# Patient Record
Sex: FEMALE | Race: WHITE | NOT HISPANIC OR LATINO | URBAN - METROPOLITAN AREA
[De-identification: names, ages, dates, MRNs, and addresses within clinical notes are randomized per-mention and may not be internally consistent; named-entity substitution may affect disease eponyms.]

---

## 2023-09-22 ENCOUNTER — HOSPITAL ENCOUNTER (OUTPATIENT)
Dept: DATA CONVERSION | Facility: HOSPITAL | Age: 68
End: 2023-09-22
Attending: INTERNAL MEDICINE | Admitting: INTERNAL MEDICINE
Payer: COMMERCIAL

## 2023-09-22 DIAGNOSIS — Q39.9 CONGENITAL MALFORMATION OF ESOPHAGUS, UNSPECIFIED: ICD-10-CM

## 2023-09-22 DIAGNOSIS — K22.89 OTHER SPECIFIED DISEASE OF ESOPHAGUS: ICD-10-CM

## 2023-09-22 DIAGNOSIS — K31.7 POLYP OF STOMACH AND DUODENUM: ICD-10-CM

## 2023-09-22 DIAGNOSIS — R13.10 DYSPHAGIA, UNSPECIFIED: ICD-10-CM

## 2023-09-22 DIAGNOSIS — R10.13 EPIGASTRIC PAIN: ICD-10-CM

## 2023-09-22 DIAGNOSIS — K21.9 GASTRO-ESOPHAGEAL REFLUX DISEASE WITHOUT ESOPHAGITIS: ICD-10-CM

## 2023-09-23 PROBLEM — K59.09 CHRONIC CONSTIPATION: Status: ACTIVE | Noted: 2023-09-23

## 2023-09-23 PROBLEM — E78.5 HYPERLIPIDEMIA: Status: ACTIVE | Noted: 2023-09-23

## 2023-09-23 PROBLEM — M99.05 SEGMENTAL AND SOMATIC DYSFUNCTION OF PELVIC REGION: Status: ACTIVE | Noted: 2023-09-23

## 2023-09-23 PROBLEM — E04.9 GOITER: Status: ACTIVE | Noted: 2023-09-23

## 2023-09-23 PROBLEM — M06.9 RHEUMATOID ARTHRITIS (MULTI): Status: ACTIVE | Noted: 2023-09-23

## 2023-09-23 PROBLEM — R07.81 RIB PAIN ON LEFT SIDE: Status: ACTIVE | Noted: 2023-09-23

## 2023-09-23 PROBLEM — R91.1 LUNG NODULE: Status: ACTIVE | Noted: 2023-09-23

## 2023-09-23 PROBLEM — M85.80 OSTEOPENIA: Status: ACTIVE | Noted: 2023-09-23

## 2023-09-23 PROBLEM — R10.9 ABDOMINAL CRAMPING: Status: ACTIVE | Noted: 2023-09-23

## 2023-09-23 PROBLEM — K21.9 GERD (GASTROESOPHAGEAL REFLUX DISEASE): Status: ACTIVE | Noted: 2023-09-23

## 2023-09-23 PROBLEM — R13.10 DYSPHAGIA: Status: ACTIVE | Noted: 2023-09-23

## 2023-09-23 PROBLEM — L68.0 HIRSUTISM: Status: ACTIVE | Noted: 2023-09-23

## 2023-09-23 PROBLEM — E25.0 CONGENITAL ADRENAL HYPERPLASIA (MULTI): Status: ACTIVE | Noted: 2023-09-23

## 2023-09-23 PROBLEM — R10.13 ABDOMINAL PAIN, EPIGASTRIC: Status: ACTIVE | Noted: 2023-09-23

## 2023-09-23 PROBLEM — R11.0 CHRONIC NAUSEA: Status: ACTIVE | Noted: 2023-09-23

## 2023-09-23 PROBLEM — D72.819 LEUKOPENIA: Status: ACTIVE | Noted: 2023-09-23

## 2023-09-23 PROBLEM — E55.9 VITAMIN D DEFICIENCY: Status: ACTIVE | Noted: 2023-09-23

## 2023-09-23 RX ORDER — CONJUGATED ESTROGENS 0.62 MG/G
1 CREAM VAGINAL 3 TIMES WEEKLY
COMMUNITY
Start: 2023-04-18

## 2023-09-23 RX ORDER — MELOXICAM 7.5 MG/1
1 TABLET ORAL DAILY PRN
COMMUNITY
Start: 2021-06-16

## 2023-09-23 RX ORDER — HYDROQUINONE 40 MG/G
CREAM TOPICAL
COMMUNITY
Start: 2023-05-30

## 2023-09-23 RX ORDER — VENLAFAXINE 37.5 MG/1
TABLET ORAL
COMMUNITY
Start: 2023-04-18

## 2023-09-23 RX ORDER — HYDROCORTISONE 25 MG/G
CREAM TOPICAL
COMMUNITY
Start: 2022-10-27

## 2023-09-23 RX ORDER — HYDROXYCHLOROQUINE SULFATE 200 MG/1
1 TABLET, FILM COATED ORAL DAILY
COMMUNITY
Start: 2015-02-03

## 2023-09-23 RX ORDER — ESTRADIOL 0.5 MG/1
TABLET ORAL
COMMUNITY
Start: 2023-04-05

## 2023-09-23 RX ORDER — TRETINOIN 1 MG/G
CREAM TOPICAL NIGHTLY
COMMUNITY

## 2023-09-23 RX ORDER — ASPIRIN 81 MG/1
TABLET ORAL
COMMUNITY
Start: 2023-01-06

## 2023-09-23 RX ORDER — TOFACITINIB 11 MG/1
TABLET, FILM COATED, EXTENDED RELEASE ORAL
COMMUNITY
Start: 2021-06-22

## 2023-09-23 RX ORDER — ONDANSETRON 4 MG/1
TABLET, FILM COATED ORAL
COMMUNITY
Start: 2023-03-22

## 2023-09-23 RX ORDER — MUPIROCIN 20 MG/G
OINTMENT TOPICAL
COMMUNITY
Start: 2023-07-10

## 2023-09-23 RX ORDER — TRETINOIN 0.1 MG/G
GEL TOPICAL
COMMUNITY

## 2023-09-23 RX ORDER — CIMETIDINE 800 MG/1
800 TABLET, FILM COATED ORAL 2 TIMES DAILY
COMMUNITY
Start: 2020-05-26

## 2023-09-23 RX ORDER — OMEPRAZOLE 40 MG/1
1 CAPSULE, DELAYED RELEASE ORAL 2 TIMES DAILY
COMMUNITY
Start: 2019-02-06

## 2023-09-23 RX ORDER — ADAPALENE GEL USP, 0.3% 3 MG/G
GEL TOPICAL
COMMUNITY
Start: 2023-02-13

## 2023-09-23 RX ORDER — PROMETHAZINE HYDROCHLORIDE 25 MG/1
1 SUPPOSITORY RECTAL AS NEEDED
COMMUNITY
Start: 2020-11-25

## 2023-09-23 RX ORDER — ATORVASTATIN CALCIUM 20 MG/1
1 TABLET, FILM COATED ORAL DAILY
COMMUNITY
Start: 2013-09-19

## 2023-09-23 RX ORDER — MUPIROCIN CALCIUM 20 MG/G
CREAM TOPICAL
COMMUNITY
Start: 2023-07-12

## 2023-09-23 RX ORDER — IPRATROPIUM BROMIDE 21 UG/1
2 SPRAY, METERED NASAL 2 TIMES DAILY
COMMUNITY
Start: 2012-12-01

## 2023-09-23 RX ORDER — GABAPENTIN 400 MG/1
1 CAPSULE ORAL 3 TIMES DAILY
COMMUNITY
Start: 2012-12-19

## 2023-10-24 ENCOUNTER — APPOINTMENT (OUTPATIENT)
Dept: ENDOCRINOLOGY | Facility: CLINIC | Age: 68
End: 2023-10-24
Payer: COMMERCIAL

## 2023-10-25 ENCOUNTER — OFFICE VISIT (OUTPATIENT)
Dept: GASTROENTEROLOGY | Facility: CLINIC | Age: 68
End: 2023-10-25
Payer: COMMERCIAL

## 2023-10-25 VITALS
BODY MASS INDEX: 20.91 KG/M2 | TEMPERATURE: 97.2 F | HEART RATE: 76 BPM | HEIGHT: 63 IN | SYSTOLIC BLOOD PRESSURE: 125 MMHG | WEIGHT: 118 LBS | DIASTOLIC BLOOD PRESSURE: 75 MMHG

## 2023-10-25 DIAGNOSIS — K21.9 GASTROESOPHAGEAL REFLUX DISEASE WITHOUT ESOPHAGITIS: Primary | ICD-10-CM

## 2023-10-25 DIAGNOSIS — R13.19 ESOPHAGEAL DYSPHAGIA: ICD-10-CM

## 2023-10-25 PROCEDURE — 99214 OFFICE O/P EST MOD 30 MIN: CPT | Performed by: INTERNAL MEDICINE

## 2023-10-25 PROCEDURE — 1159F MED LIST DOCD IN RCRD: CPT | Performed by: INTERNAL MEDICINE

## 2023-10-25 PROCEDURE — 1126F AMNT PAIN NOTED NONE PRSNT: CPT | Performed by: INTERNAL MEDICINE

## 2023-10-25 RX ORDER — MECLIZINE HYDROCHLORIDE 25 MG/1
TABLET ORAL
COMMUNITY
Start: 1970-01-01

## 2023-10-25 RX ORDER — ISOTRETINOIN 40 MG/1
40 CAPSULE ORAL
COMMUNITY
Start: 2023-09-06

## 2023-10-25 RX ORDER — BUPROPION HCL 150 MG
TABLET, EXTENDED RELEASE 24 HR ORAL
COMMUNITY
Start: 2023-09-24

## 2023-10-25 RX ORDER — LIFITEGRAST 50 MG/ML
SOLUTION/ DROPS OPHTHALMIC
COMMUNITY

## 2023-10-25 RX ORDER — PIROXICAM 20 MG/1
20 CAPSULE ORAL
COMMUNITY
Start: 2020-09-02

## 2023-10-25 RX ORDER — DIAZEPAM 10 MG/1
TABLET ORAL
COMMUNITY
Start: 1970-01-01

## 2023-10-25 RX ORDER — DOCUSATE SODIUM 100 MG/1
CAPSULE, LIQUID FILLED ORAL
COMMUNITY
Start: 1970-01-01

## 2023-10-25 RX ORDER — DICLOFENAC SODIUM 10 MG/G
1 GEL TOPICAL 3 TIMES DAILY
COMMUNITY
Start: 2021-03-15

## 2023-10-25 NOTE — PATIENT INSTRUCTIONS
As we discussed you have esophageal motility issues likely related to the RA. You should continue to monitor your symptoms and stay in the medications. You are scheduled for routine colonoscopy.

## 2023-10-25 NOTE — PROGRESS NOTES
"History Of Present Illness  Adelaide Cedillo is a 67 y.o. female presenting with GERD    She has a history of rheumatoid arthritis and has had a longstanding history of digestive problems.  Most recently she underwent an upper endoscopy that showed a very tortuous esophagus but no mucosal lesions.  She also had a benign gastric polyp seen.  Her last colonoscopy was in 2016 and was negative for polyps.  She is currently being treated with omeprazole, cimetidine and Zofran as needed. Other workup has included a manometry in 2018 that showed absent peristalsis and low LES pressure and a normal gastric emptying study. A barium swallow showed significant reflux as well as a cricopharyngeal bar that was not appreciated on EGD.      Past Medical History  Past Medical History:   Diagnosis Date    Encounter for therapeutic drug level monitoring 04/03/2017    Encounter for monitoring of methotrexate therapy       Surgical History  No past surgical history on file.     Social History  She has no history on file for tobacco use, alcohol use, and drug use.    Family History  Family History   Problem Relation Name Age of Onset    Other (chronic kidney disease) Mother      Diabetes Mother      Hypertension Mother      Other (creutzfeldt ying disease) Father          Allergies  Adhesive tape-silicones, Fentanyl, Iodinated contrast media, Latex, Prochlorperazine, Sulfa (sulfonamide antibiotics), and Iodine    Review of Systems     Physical Exam    Lab Results   Component Value Date    GLUCOSE 83 02/08/2022    CALCIUM 9.7 02/08/2022     02/08/2022    K 4.1 02/08/2022    CO2 31 02/08/2022     (H) 02/08/2022    BUN 19 02/08/2022    CREATININE 0.97 02/08/2022   No results found for: \"WBC\", \"HGB\", \"HCT\", \"MCV\", \"PLT\"      No lab exists for component: \"LABALBU\"     Last Recorded Vitals  Blood pressure 125/75, pulse 76, temperature 36.2 °C (97.2 °F), height 1.6 m (5' 3\"), weight 53.5 kg (118 lb).    Relevant Results           "   ASSESSMENT & PLAN  Problem List Items Addressed This Visit    None     I suspect that her esophageal motility is related to her connective tissue disease and unfortunately there are no effective medical treatments for the dysmotility.  She should avoid any foods that are more difficult to swallow and continue on acid suppression as she is doing.  She is scheduled for colonoscopy later this year for routine screening.    I spent 30 minutes in the professional and overall care of this patient.      Lance Quintero MD

## 2023-12-01 ENCOUNTER — ANESTHESIA (OUTPATIENT)
Dept: GASTROENTEROLOGY | Facility: HOSPITAL | Age: 68
End: 2023-12-01
Payer: COMMERCIAL

## 2023-12-01 ENCOUNTER — HOSPITAL ENCOUNTER (OUTPATIENT)
Dept: GASTROENTEROLOGY | Facility: HOSPITAL | Age: 68
Setting detail: OUTPATIENT SURGERY
Discharge: HOME | End: 2023-12-01
Payer: COMMERCIAL

## 2023-12-01 ENCOUNTER — ANESTHESIA EVENT (OUTPATIENT)
Dept: GASTROENTEROLOGY | Facility: HOSPITAL | Age: 68
End: 2023-12-01
Payer: COMMERCIAL

## 2023-12-01 VITALS
HEIGHT: 63 IN | DIASTOLIC BLOOD PRESSURE: 68 MMHG | HEART RATE: 91 BPM | BODY MASS INDEX: 20.38 KG/M2 | OXYGEN SATURATION: 98 % | TEMPERATURE: 96.9 F | SYSTOLIC BLOOD PRESSURE: 133 MMHG | RESPIRATION RATE: 14 BRPM | WEIGHT: 115 LBS

## 2023-12-01 DIAGNOSIS — Z86.010 PERSONAL HISTORY OF COLONIC POLYPS: Primary | ICD-10-CM

## 2023-12-01 DIAGNOSIS — R11.0 CHRONIC NAUSEA: Primary | ICD-10-CM

## 2023-12-01 PROBLEM — R11.2 PONV (POSTOPERATIVE NAUSEA AND VOMITING): Status: ACTIVE | Noted: 2023-12-01

## 2023-12-01 PROBLEM — Z98.890 PONV (POSTOPERATIVE NAUSEA AND VOMITING): Status: ACTIVE | Noted: 2023-12-01

## 2023-12-01 PROCEDURE — 88305 TISSUE EXAM BY PATHOLOGIST: CPT | Mod: TC,SUR | Performed by: INTERNAL MEDICINE

## 2023-12-01 PROCEDURE — 7100000009 HC PHASE TWO TIME - INITIAL BASE CHARGE: Performed by: INTERNAL MEDICINE

## 2023-12-01 PROCEDURE — 3700000001 HC GENERAL ANESTHESIA TIME - INITIAL BASE CHARGE: Performed by: INTERNAL MEDICINE

## 2023-12-01 PROCEDURE — 2500000004 HC RX 250 GENERAL PHARMACY W/ HCPCS (ALT 636 FOR OP/ED)

## 2023-12-01 PROCEDURE — 7100000010 HC PHASE TWO TIME - EACH INCREMENTAL 1 MINUTE: Performed by: INTERNAL MEDICINE

## 2023-12-01 PROCEDURE — 88305 TISSUE EXAM BY PATHOLOGIST: CPT | Performed by: PATHOLOGY

## 2023-12-01 PROCEDURE — A45378 PR COLONOSCOPY,DIAGNOSTIC

## 2023-12-01 PROCEDURE — A45378 PR COLONOSCOPY,DIAGNOSTIC: Performed by: ANESTHESIOLOGY

## 2023-12-01 PROCEDURE — 3700000002 HC GENERAL ANESTHESIA TIME - EACH INCREMENTAL 1 MINUTE: Performed by: INTERNAL MEDICINE

## 2023-12-01 PROCEDURE — 45385 COLONOSCOPY W/LESION REMOVAL: CPT | Performed by: INTERNAL MEDICINE

## 2023-12-01 PROCEDURE — 2720000007 HC OR 272 NO HCPCS: Performed by: INTERNAL MEDICINE

## 2023-12-01 PROCEDURE — 45380 COLONOSCOPY AND BIOPSY: CPT | Performed by: INTERNAL MEDICINE

## 2023-12-01 RX ORDER — ONDANSETRON HYDROCHLORIDE 2 MG/ML
INJECTION, SOLUTION INTRAVENOUS AS NEEDED
Status: DISCONTINUED | OUTPATIENT
Start: 2023-12-01 | End: 2023-12-01

## 2023-12-01 RX ORDER — LIDOCAINE HYDROCHLORIDE 10 MG/ML
0.1 INJECTION INFILTRATION; PERINEURAL ONCE
Status: DISCONTINUED | OUTPATIENT
Start: 2023-12-01 | End: 2023-12-02 | Stop reason: HOSPADM

## 2023-12-01 RX ORDER — ONDANSETRON HYDROCHLORIDE 2 MG/ML
4 INJECTION, SOLUTION INTRAVENOUS ONCE AS NEEDED
Status: DISCONTINUED | OUTPATIENT
Start: 2023-12-01 | End: 2023-12-02 | Stop reason: HOSPADM

## 2023-12-01 RX ORDER — SODIUM CHLORIDE, SODIUM LACTATE, POTASSIUM CHLORIDE, CALCIUM CHLORIDE 600; 310; 30; 20 MG/100ML; MG/100ML; MG/100ML; MG/100ML
20 INJECTION, SOLUTION INTRAVENOUS CONTINUOUS
Status: DISCONTINUED | OUTPATIENT
Start: 2023-12-01 | End: 2023-12-02 | Stop reason: HOSPADM

## 2023-12-01 RX ORDER — SODIUM CHLORIDE, SODIUM LACTATE, POTASSIUM CHLORIDE, CALCIUM CHLORIDE 600; 310; 30; 20 MG/100ML; MG/100ML; MG/100ML; MG/100ML
100 INJECTION, SOLUTION INTRAVENOUS CONTINUOUS
Status: DISCONTINUED | OUTPATIENT
Start: 2023-12-01 | End: 2023-12-02 | Stop reason: HOSPADM

## 2023-12-01 RX ORDER — PROPOFOL 10 MG/ML
INJECTION, EMULSION INTRAVENOUS AS NEEDED
Status: DISCONTINUED | OUTPATIENT
Start: 2023-12-01 | End: 2023-12-01

## 2023-12-01 RX ORDER — PROPOFOL 10 MG/ML
INJECTION, EMULSION INTRAVENOUS CONTINUOUS PRN
Status: DISCONTINUED | OUTPATIENT
Start: 2023-12-01 | End: 2023-12-01

## 2023-12-01 RX ORDER — MIDAZOLAM HYDROCHLORIDE 1 MG/ML
INJECTION, SOLUTION INTRAMUSCULAR; INTRAVENOUS AS NEEDED
Status: DISCONTINUED | OUTPATIENT
Start: 2023-12-01 | End: 2023-12-01

## 2023-12-01 RX ORDER — PROMETHAZINE HYDROCHLORIDE 25 MG/1
25 SUPPOSITORY RECTAL EVERY 6 HOURS PRN
Qty: 30 SUPPOSITORY | Refills: 0 | Status: SHIPPED | OUTPATIENT
Start: 2023-12-01 | End: 2023-12-08

## 2023-12-01 RX ADMIN — MIDAZOLAM 2 MG: 1 INJECTION INTRAMUSCULAR; INTRAVENOUS at 09:36

## 2023-12-01 RX ADMIN — PROPOFOL 50 MG: 10 INJECTION, EMULSION INTRAVENOUS at 09:41

## 2023-12-01 RX ADMIN — ONDANSETRON 4 MG: 2 INJECTION INTRAMUSCULAR; INTRAVENOUS at 09:39

## 2023-12-01 RX ADMIN — PROPOFOL 20 MG: 10 INJECTION, EMULSION INTRAVENOUS at 10:09

## 2023-12-01 RX ADMIN — SODIUM CHLORIDE, SODIUM LACTATE, POTASSIUM CHLORIDE, AND CALCIUM CHLORIDE: 600; 310; 30; 20 INJECTION, SOLUTION INTRAVENOUS at 09:36

## 2023-12-01 RX ADMIN — PROPOFOL 200 MCG/KG/MIN: 10 INJECTION, EMULSION INTRAVENOUS at 09:41

## 2023-12-01 ASSESSMENT — PAIN SCALES - GENERAL
PAIN_LEVEL: 0
PAINLEVEL_OUTOF10: 0 - NO PAIN
PAINLEVEL_OUTOF10: 0 - NO PAIN

## 2023-12-01 ASSESSMENT — PAIN - FUNCTIONAL ASSESSMENT
PAIN_FUNCTIONAL_ASSESSMENT: 0-10
PAIN_FUNCTIONAL_ASSESSMENT: 0-10

## 2023-12-01 ASSESSMENT — COLUMBIA-SUICIDE SEVERITY RATING SCALE - C-SSRS
2. HAVE YOU ACTUALLY HAD ANY THOUGHTS OF KILLING YOURSELF?: NO
6. HAVE YOU EVER DONE ANYTHING, STARTED TO DO ANYTHING, OR PREPARED TO DO ANYTHING TO END YOUR LIFE?: NO
1. IN THE PAST MONTH, HAVE YOU WISHED YOU WERE DEAD OR WISHED YOU COULD GO TO SLEEP AND NOT WAKE UP?: NO

## 2023-12-01 NOTE — ANESTHESIA POSTPROCEDURE EVALUATION
Patient: Adelaide Cedillo    Procedure Summary       Date: 12/01/23 Room / Location: [facility]    Anesthesia Start: 0936 Anesthesia Stop: 1019    Procedures:       COLONOSCOPY      EGD Diagnosis:       Personal history of colonic polyps      Personal history of colonic polyps    Scheduled Providers: Lance Quintero MD Responsible Provider: Jasper Hernandez MD    Anesthesia Type: MAC ASA Status: 2            Anesthesia Type: MAC    Vitals Value Taken Time   /76 12/01/23 1033   Temp 36.1 °C (96.9 °F) 12/01/23 1018   Pulse 85 12/01/23 1033   Resp 15 12/01/23 1033   SpO2 98 % 12/01/23 1033       Anesthesia Post Evaluation    Patient location during evaluation: bedside  Patient participation: complete - patient participated  Level of consciousness: awake  Pain score: 0  Pain management: adequate  Airway patency: patent  Cardiovascular status: acceptable  Respiratory status: acceptable  Hydration status: acceptable  Postoperative Nausea and Vomiting: none    There were no known notable events for this encounter.

## 2023-12-01 NOTE — ANESTHESIA PREPROCEDURE EVALUATION
Patient: Adelaide Cedillo    Procedure Information       Date/Time: 12/01/23 0900    Scheduled providers: Lance Quintero MD    Procedures:       COLONOSCOPY      EGD    Location: [facility]            Relevant Problems   Anesthesia   (+) PONV (postoperative nausea and vomiting)      Cardiovascular   (+) Hyperlipidemia   (+) Rib pain on left side      Endocrine   (+) Goiter      GI   (+) Gastroesophageal reflux disease without esophagitis      /Renal (within normal limits)      GI/Hepatic (within normal limits)      Musculoskeletal   (+) Rheumatoid arthritis (CMS/HCC)      Other   (+) Rheumatoid arthritis (CMS/HCC)     Past Medical History:   Diagnosis Date   • Encounter for therapeutic drug level monitoring 04/03/2017    Encounter for monitoring of methotrexate therapy        Social History     Tobacco Use   • Smoking status: Never   • Smokeless tobacco: Never   Substance Use Topics   • Alcohol use: Not Currently      Current Outpatient Medications   Medication Instructions   • adapalene (Differin) 0.3 % gel Topical   • aspirin 81 mg EC tablet oral   • atorvastatin (Lipitor) 20 mg tablet 1 tablet, oral, Daily   • cimetidine (TAGAMET) 800 mg, oral, 2 times daily, and 2 doses at bedtime   • diazePAM (Valium) 10 mg tablet oral   • diclofenac sodium (VOLTAREN) 1 g, Topical, 3 times daily   • docusate sodium (Colace) 100 mg capsule oral, Daily RT   • estradiol (Estrace) 0.5 mg tablet oral   • hydrocortisone 2.5 % cream Topical   • hydroquinone 4 % cream Topical   • hydroxychloroquine (Plaquenil) 200 mg tablet 1 tablet, oral, Daily   • ipratropium (Atrovent) 21 mcg (0.03 %) nasal spray 2 sprays, Each Nostril, 2 times daily   • ISOtretinoin (ACCUTANE) 40 mg, oral, Daily RT   • meclizine (Antivert) 25 mg tablet as needed.   • meloxicam (Mobic) 7.5 mg tablet 1 tablet, oral, Daily PRN   • mupirocin (Bactroban) 2 % cream Topical   • mupirocin (Bactroban) 2 % ointment Topical   • Neurontin 400 mg capsule 1  "capsule, oral, 3 times daily   • omeprazole (PriLOSEC) 40 mg DR capsule 1 capsule, oral, 2 times daily   • ondansetron (Zofran) 4 mg tablet oral   • piroxicam (FELDENE) 20 mg   • Premarin 1 g, vaginal, 3 times weekly, 3 weeks on and 1 week off   • promethazine (Phenergan) 25 mg suppository 1 suppository, rectal, As needed, 4 TO 6 HOURS   • Retin-A 0.1 % cream Topical, Nightly,  APPLY A PEA SIZED AMOUNT   • tretinoin microspheres (Retin-A Micro) 0.1 % gel Topical   • triamcinolone acetonide (KENALOG) 10 mg, intralesional, Every 30 days   • venlafaxine (Effexor) 37.5 mg tablet oral   • Wellbutrin  mg 24 hr tablet    • Xeljanz XR 11 mg tablet extended release 24 hr oral   • Xiidra 5 % dropperette instill one drop into both eyes twice a day      Allergies   Allergen Reactions   • Adhesive Tape-Silicones Unknown     skin redness   • Fentanyl Unknown   • Iodinated Contrast Media Hives   • Latex Other     Redness to skin and irritation   • Prochlorperazine Nausea/vomiting   • Sulfa (Sulfonamide Antibiotics) Hives   • Iodine Rash and Unknown        Chemistry    Lab Results   Component Value Date/Time     02/08/2022 1600    K 4.1 02/08/2022 1600     (H) 02/08/2022 1600    CO2 31 02/08/2022 1600    BUN 19 02/08/2022 1600    CREATININE 0.97 02/08/2022 1600    Lab Results   Component Value Date/Time    CALCIUM 9.7 02/08/2022 1600    ALKPHOS 45 02/08/2022 1600    AST 23 02/08/2022 1600    ALT 16 02/08/2022 1600    BILITOT 0.4 02/08/2022 1600          No results found for: \"WBC\", \"HGB\", \"HCT\", \"PLT\"  No results found for: \"PROTIME\", \"PTT\", \"INR\"  No results found for this or any previous visit (from the past 4464 hour(s)).  No results found for this or any previous visit from the past 1095 days.       Visit Vitals  /68   Pulse 102   Temp 36.5 °C (97.7 °F) (Temporal)   Resp 16   Ht 1.6 m (5' 3\")   Wt 52.2 kg (115 lb)   BMI 20.37 kg/m²   OB Status Postmenopausal   Smoking Status Never   BSA 1.52 m²        " Anesthesia Evaluation      Airway   Mallampati: II  TM distance: >3 FB  Neck ROM: full  Dental - normal exam     Pulmonary     breath sounds clear to auscultation  Cardiovascular     Rhythm: regular  Rate: normal    Neuro/Psych      GI/Hepatic/Renal      Endo/Other    Abdominal                    Clinical information reviewed:   Tobacco  Allergies  Meds  Problems  Med Hx  Surg Hx   Fam Hx  Soc   Hx        NPO Detail:  NPO/Void Status  Date of Last Liquid: 12/01/23  Time of Last Liquid: 1900  Date of Last Solid: 11/29/23  Time of Last Solid: 1700  Last Intake Type: GI prep  Time of Last Void: 0807         Physical Exam    Airway  Mallampati: II  TM distance: >3 FB  Neck ROM: full     Cardiovascular   Rhythm: regular  Rate: normal     Dental - normal exam     Pulmonary   Breath sounds clear to auscultation     Abdominal        Anesthesia Plan    ASA 2     MAC     intravenous induction   Anesthetic plan and risks discussed with patient.    Plan discussed with CRNA, attending and CAA.

## 2023-12-01 NOTE — DISCHARGE INSTRUCTIONS
Patient Instructions after a Colonoscopy      The anesthetics, sedatives or narcotics which were given to you today will be acting in your body for the next 24 hours, so you might feel a little sleepy or groggy.  This feeling should slowly wear off. Carefully read and follow the instructions.     You received sedation today:  - Do not drive or operate any machinery or power tools of any kind.   - No alcoholic beverages today, not even beer or wine.  - Do not make any important decisions or sign any legal documents.  - No over the counter medications that contain alcohol or that may cause drowsiness.  - Do not make any important decisions or sign any legal documents.    While it is common to experience mild to moderate abdominal distention, gas, or belching after your procedure, if any of these symptoms occur following discharge from the GI Lab or within one week of having your procedure, call the [facility] to be advised whether a visit to your nearest Urgent Care or Emergency Department is indicated.  Take this paper with you if you go.     - If you develop an allergic reaction to the medications that were given during your procedure such as difficulty breathing, rash, hives, severe nausea, vomiting or lightheadedness.  - If you experience chest pain, shortness of breath, severe abdominal pain, fevers and chills.  -If you develop signs and symptoms of bleeding such as blood in your spit, if your stools turn black, tarry, or bloody  - If you have not urinated within 8 hours following your procedure.  - If your IV site becomes painful, red, inflamed, or looks infected.    If you received a biopsy/polypectomy/sphincterotomy the following instructions apply below:    __ Do not use Aspirin containing products, non-steroidal medications or anti-coagulants for one week following your procedure. (Examples of these types of medications are: Advil, Arthrotec, Aleve, Coumadin, Ecotrin, Heparin, Ibuprofen,  Indocin, Motrin, Naprosyn, Nuprin, Plavix, Vioxx, and Voltarin, or their generic forms.  This list is not all-inclusive.  Check with your physician or pharmacist before resuming medications.)   __ Eat a soft diet today.  Avoid foods that are poorly digested for the next 24 hours.  These foods would include: nuts, beans, lettuce, red meats, and fried foods. Start with liquids and advance your diet as tolerated, gradually work up to eating solids.   __ Do not have a Barium Study or Enema for one week.    Your physician recommends the additional following instructions:    -You have a contact number available for emergencies. The signs and symptoms of potential delayed complications were discussed with you. You may return to normal activities tomorrow.  -Resume your previous diet.  -Continue your present medications.   -We are waiting for your pathology results.  -Your physician has recommended a repeat colonoscopy (date to be determined after pending pathology results are reviewed) for surveillance based on pathology results.  -The findings and recommendations have been discussed with you.  -The findings and recommendations were discussed with your family.  - Please see Medication Reconciliation Form for new medication/medications prescribed.       If you experience any problems or have any questions following discharge from the GI Lab, please call:    Lance Quintero MD  [phone]      Nurse Signature                                                                        Date___________________                                                                            Patient/Responsible Party Signature                                        Date___________________

## 2023-12-13 LAB
LABORATORY COMMENT REPORT: NORMAL
PATH REPORT.FINAL DX SPEC: NORMAL
PATH REPORT.GROSS SPEC: NORMAL
PATH REPORT.TOTAL CANCER: NORMAL

## 2023-12-27 ENCOUNTER — APPOINTMENT (OUTPATIENT)
Dept: GASTROENTEROLOGY | Facility: CLINIC | Age: 68
End: 2023-12-27
Payer: COMMERCIAL

## 2024-02-06 ENCOUNTER — APPOINTMENT (OUTPATIENT)
Dept: ENDOCRINOLOGY | Facility: CLINIC | Age: 69
End: 2024-02-06
Payer: COMMERCIAL

## 2024-02-07 ENCOUNTER — OFFICE VISIT (OUTPATIENT)
Dept: GASTROENTEROLOGY | Facility: CLINIC | Age: 69
End: 2024-02-07
Payer: COMMERCIAL

## 2024-02-07 VITALS
HEART RATE: 80 BPM | HEIGHT: 63 IN | DIASTOLIC BLOOD PRESSURE: 77 MMHG | SYSTOLIC BLOOD PRESSURE: 128 MMHG | TEMPERATURE: 97.5 F | BODY MASS INDEX: 21.45 KG/M2 | WEIGHT: 121.07 LBS

## 2024-02-07 DIAGNOSIS — R13.19 ESOPHAGEAL DYSPHAGIA: Primary | ICD-10-CM

## 2024-02-07 DIAGNOSIS — R10.13 ABDOMINAL PAIN, EPIGASTRIC: ICD-10-CM

## 2024-02-07 PROCEDURE — 99214 OFFICE O/P EST MOD 30 MIN: CPT | Performed by: INTERNAL MEDICINE

## 2024-02-07 PROCEDURE — 1159F MED LIST DOCD IN RCRD: CPT | Performed by: INTERNAL MEDICINE

## 2024-02-07 PROCEDURE — 1036F TOBACCO NON-USER: CPT | Performed by: INTERNAL MEDICINE

## 2024-02-07 PROCEDURE — 1126F AMNT PAIN NOTED NONE PRSNT: CPT | Performed by: INTERNAL MEDICINE

## 2024-02-07 RX ORDER — ERGOCALCIFEROL 1.25 MG/1
1 CAPSULE ORAL
COMMUNITY
Start: 2023-11-08

## 2024-02-07 RX ORDER — MEDROXYPROGESTERONE ACETATE 5 MG/1
TABLET ORAL
COMMUNITY
Start: 2024-01-31

## 2024-02-07 NOTE — PATIENT INSTRUCTIONS
Your colonoscopy had 2 small polyps that were benign and removed. 5 year follow up recommended.    Endoscopy showed a tortuous esophagus but no other major findings. You can try Phazyme or Gax Ex as needed as well as Gaviscon for breakthrough symptoms. Follow up in 4 months.

## 2024-02-07 NOTE — PROGRESS NOTES
"History Of Present Illness  Adelaide Cedillo is a 68 y.o. female presenting with GERD.     She complains of frequent regurgitation and a gnawing feeling in the epigastrium that is worse with fasting. She takes high dose cimetidine with some reflief. Her last colonoscopy had 2 adenomas and a 5 year follow up was recommended. EGD had a tortuous esophagus and fundic gland polyp removed. Weight stable.    Past Medical History  Past Medical History:   Diagnosis Date    Encounter for therapeutic drug level monitoring 04/03/2017    Encounter for monitoring of methotrexate therapy       Surgical History  Past Surgical History:   Procedure Laterality Date    COLONOSCOPY          Social History  She reports that she has never smoked. She has never used smokeless tobacco. She reports that she does not currently use alcohol. She reports that she does not use drugs.    Family History  Family History   Problem Relation Name Age of Onset    Other (chronic kidney disease) Mother      Diabetes Mother      Hypertension Mother      Other (creutzfeldt ying disease) Father          Allergies  Adhesive tape-silicones, Fentanyl, Iodinated contrast media, Latex, Prochlorperazine, Sulfa (sulfonamide antibiotics), and Iodine    Last Recorded Vitals  /77 (BP Location: Left arm, Patient Position: Sitting)   Pulse 80   Temp 36.4 °C (97.5 °F)   Ht 1.6 m (5' 3\")   Wt 54.9 kg (121 lb 1.1 oz)   BMI 21.45 kg/m²        Physical Exam  Constitutional:       Appearance: Normal appearance.   Cardiovascular:      Rate and Rhythm: Normal rate and regular rhythm.   Pulmonary:      Effort: Pulmonary effort is normal.      Breath sounds: Normal breath sounds.   Abdominal:      General: Abdomen is flat. Bowel sounds are normal.      Palpations: Abdomen is soft.      Tenderness: There is no abdominal tenderness.   Neurological:      Mental Status: She is alert.           Labs  Lab Results   Component Value Date    GLUCOSE 83 02/08/2022    CALCIUM " 9.7 02/08/2022     02/08/2022    K 4.1 02/08/2022    CO2 31 02/08/2022     (H) 02/08/2022    BUN 19 02/08/2022    CREATININE 0.97 02/08/2022     Lab Results   Component Value Date     02/08/2022    K 4.1 02/08/2022     (H) 02/08/2022    CO2 31 02/08/2022    BUN 19 02/08/2022    CREATININE 0.97 02/08/2022    CALCIUM 9.7 02/08/2022    PROT 6.4 02/08/2022    BILITOT 0.4 02/08/2022    ALKPHOS 45 02/08/2022    ALT 16 02/08/2022    AST 23 02/08/2022    GLUCOSE 83 02/08/2022           Imaging          ASSESSMENT & PLAN  Problem List Items Addressed This Visit             ICD-10-CM    Abdominal pain, epigastric R10.13    Esophageal dysphagia - Primary R13.19     She has esophageal dysmotility presumably on the basis of her underlying connective tissue disease though does not have a diagnosis of chelsea scleroderma.  I recommended supportive therapy with simethicone as needed as well as Gaviscon for breakthrough symptoms.  She will continue on cimetidine as prescribed.  She asked about repeating the endoscopy to assess for serial changes.  There is no clear utility in this but we can certainly discuss it at her next visit.  She will be due for repeat colonoscopy in about 2028.  Follow-up in 4 months.    I spent 30 minutes in the professional and overall care of this patient.      Lance Quintero MD

## 2024-06-12 ENCOUNTER — OFFICE VISIT (OUTPATIENT)
Dept: GASTROENTEROLOGY | Facility: CLINIC | Age: 69
End: 2024-06-12
Payer: COMMERCIAL

## 2024-06-12 VITALS
HEART RATE: 75 BPM | WEIGHT: 114 LBS | SYSTOLIC BLOOD PRESSURE: 103 MMHG | HEIGHT: 63 IN | DIASTOLIC BLOOD PRESSURE: 65 MMHG | TEMPERATURE: 97.2 F | BODY MASS INDEX: 20.2 KG/M2

## 2024-06-12 DIAGNOSIS — R11.0 NAUSEA: Primary | ICD-10-CM

## 2024-06-12 DIAGNOSIS — K21.9 GASTROESOPHAGEAL REFLUX DISEASE WITHOUT ESOPHAGITIS: ICD-10-CM

## 2024-06-12 PROCEDURE — 99214 OFFICE O/P EST MOD 30 MIN: CPT | Performed by: INTERNAL MEDICINE

## 2024-06-12 PROCEDURE — 1159F MED LIST DOCD IN RCRD: CPT | Performed by: INTERNAL MEDICINE

## 2024-06-12 RX ORDER — PROMETHAZINE HYDROCHLORIDE 25 MG/1
25 SUPPOSITORY RECTAL EVERY 6 HOURS PRN
Qty: 30 SUPPOSITORY | Refills: 11 | Status: SHIPPED | OUTPATIENT
Start: 2024-06-12 | End: 2025-06-12

## 2024-06-12 RX ORDER — ONDANSETRON 4 MG/1
4 TABLET, FILM COATED ORAL EVERY 8 HOURS PRN
Qty: 90 TABLET | Refills: 11 | Status: SHIPPED | OUTPATIENT
Start: 2024-06-12 | End: 2025-06-12

## 2024-06-12 RX ORDER — BACLOFEN 10 MG/1
10 TABLET ORAL NIGHTLY
Qty: 30 TABLET | Refills: 11 | Status: SHIPPED | OUTPATIENT
Start: 2024-06-12 | End: 2025-06-12

## 2024-06-12 NOTE — PATIENT INSTRUCTIONS
Continue cimetidine as you are doing. I refilled the Phenergan and Zofran. We will try you on Baclofen at bedtime - this tightens the lower sphincter.  Follow up in 4 months.

## 2024-06-13 NOTE — PROGRESS NOTES
Subjective   She complains of frequent regurgitation and a gnawing feeling in the epigastrium that is worse with fasting. She takes high dose cimetidine with some reflief. Her last colonoscopy had 2 adenomas and a 5 year follow up was recommended. EGD had a tortuous esophagus and fundic gland polyp removed. Weight stable.     She has esophageal dysmotility presumably on the basis of her underlying connective tissue disease though does not have a diagnosis of chelsea scleroderma. I recommended supportive therapy with simethicone as needed as well as Gaviscon for breakthrough symptoms. She will continue on cimetidine as prescribed. She asked about repeating the endoscopy to assess for serial changes. She will be due for repeat colonoscopy in about 2028.     TODAY:    Patient continues to complain of significant acid reflux, troubling her while she’s sleeping at times. Her bloating is getting worse and most importantly, she’s having consistent morning sickness for which she has to take zofran on a daily basis. She denies any regurgitation of food or weight loss. She mentions 4 months ago at bedtime she started to feel nausea, had diarrhea for several hours, she took zofran and a promethazine suppository which didn’t work very well. She wants to be prescribed more zofran and more promethazine suppository incase this incident occurs again. Important to note that she has vertigo which also causes her to be nauseous       ROS:     Denies headaches, fevers, chills, sweats, sob, chest pain, urinary symptoms or change in bowel movements.    Objective   Vital signs in last 24 hours:  Temp:  [36.2 °C (97.2 °F)] 36.2 °C (97.2 °F)  Heart Rate:  [75] 75  BP: (103)/(65) 103/65        Physical Exam  GEN: Healthy appearing, well-developed, NAD.  PSYCH: Good Judgment. AOx3. Normal memory, mood, and affect.  HEENT:  -Head: NC/AT;  -Eyes: No discharge or redness;  -Ears: External ears are normal.  -Nose: Normal nares.  -Mouth and throat:  MMM. Normal gums, mucosa, palate,. Good dentition.  CV: RRR, no m/r/g.  LUNGS: CTAB, no w/r/c.  ABD: Soft, NT/ND, NBS, no masses or organomegaly.  : N/A  SKIN: Warm, well perfused. No skin rashes or abnormal lesions.  MSK: Normal gait. No deformities.  EXT: No clubbing, cyanosis, or edema.  NEURO: Ambulating with no limitations. No focal deficits.     Assessment/Plan   67 y/o F with pmhx of esophageal dysmotility, GERD, tortious esophagus visualized on EGD who is currently being managed mainly for reflux/dysmotility symptoms. She seems to be having morning nausea and continued acid reflux which has been distressing for her. Her isolated incident of emesis and diarrhea may have been related to an acute viral illness or food exposure, she has not had those same symptoms since.     Plan:   -Will trial Baclofen to help tighten LES sphincter  - Continue cimetidine as prescribed  - Refill zofran and phenergan    I saw and evaluated the patient. I personally obtained the key and critical portions of the history and physical exam or was physically present for key and critical portions performed by the trainee. I agree with the trainee's medical decision making as described in the trainee's note.    As noted will treat symptomatically and give therapeutic trial of Baclofen at bedtime.    Lance Quintero MD

## 2024-09-09 ENCOUNTER — TELEPHONE (OUTPATIENT)
Dept: GASTROENTEROLOGY | Facility: CLINIC | Age: 69
End: 2024-09-09
Payer: COMMERCIAL

## 2024-09-09 NOTE — TELEPHONE ENCOUNTER
Hi Dr. Quintero,   I received a call from Dr. Love's office. She is requesting  a call back from you directly to discuss patient CT results. Dr. Love's cell number is [phone].

## 2024-10-23 ENCOUNTER — APPOINTMENT (OUTPATIENT)
Dept: GASTROENTEROLOGY | Facility: CLINIC | Age: 69
End: 2024-10-23
Payer: COMMERCIAL

## 2025-01-22 ENCOUNTER — APPOINTMENT (OUTPATIENT)
Dept: GASTROENTEROLOGY | Facility: CLINIC | Age: 70
End: 2025-01-22
Payer: COMMERCIAL

## 2025-01-23 ENCOUNTER — TELEPHONE (OUTPATIENT)
Dept: GASTROENTEROLOGY | Facility: HOSPITAL | Age: 70
End: 2025-01-23
Payer: COMMERCIAL

## 2025-01-23 DIAGNOSIS — R11.0 NAUSEA: ICD-10-CM

## 2025-01-23 DIAGNOSIS — K21.9 GASTROESOPHAGEAL REFLUX DISEASE WITHOUT ESOPHAGITIS: Primary | ICD-10-CM

## 2025-01-23 RX ORDER — CIMETIDINE 800 MG/1
800 TABLET, FILM COATED ORAL 2 TIMES DAILY
Qty: 180 TABLET | Refills: 3 | Status: SHIPPED | OUTPATIENT
Start: 2025-01-23 | End: 2026-01-23

## 2025-01-23 RX ORDER — ONDANSETRON 4 MG/1
4 TABLET, FILM COATED ORAL EVERY 8 HOURS PRN
Qty: 90 TABLET | Refills: 11 | Status: SHIPPED | OUTPATIENT
Start: 2025-01-23 | End: 2026-01-23

## 2025-01-23 NOTE — TELEPHONE ENCOUNTER
Patient called in requesting refill.     mendiotcimetidine (Tagamet) 800 mg tablet - [facility]     ondansetron (Zofran) 4 mg tablet - [facility] pharmacy

## 2025-01-28 DIAGNOSIS — R11.0 NAUSEA: ICD-10-CM

## 2025-01-29 RX ORDER — ONDANSETRON 4 MG/1
4 TABLET, FILM COATED ORAL EVERY 8 HOURS PRN
Qty: 90 TABLET | Refills: 11 | Status: SHIPPED | OUTPATIENT
Start: 2025-01-29 | End: 2026-01-29

## 2025-01-30 ENCOUNTER — TELEPHONE (OUTPATIENT)
Dept: GASTROENTEROLOGY | Facility: HOSPITAL | Age: 70
End: 2025-01-30
Payer: COMMERCIAL

## 2025-01-30 DIAGNOSIS — R11.0 NAUSEA: ICD-10-CM

## 2025-01-30 DIAGNOSIS — R11.0 NAUSEA: Primary | ICD-10-CM

## 2025-01-30 RX ORDER — ONDANSETRON HYDROCHLORIDE 8 MG/1
8 TABLET, FILM COATED ORAL 3 TIMES DAILY
Qty: 180 TABLET | Refills: 3 | Status: SHIPPED | OUTPATIENT
Start: 2025-01-30 | End: 2025-03-01

## 2025-01-30 RX ORDER — ONDANSETRON HYDROCHLORIDE 8 MG/1
8 TABLET, FILM COATED ORAL 3 TIMES DAILY
Qty: 20 TABLET | Refills: 11 | Status: SHIPPED | OUTPATIENT
Start: 2025-01-30 | End: 2025-01-30 | Stop reason: SDUPTHER

## 2025-01-30 NOTE — TELEPHONE ENCOUNTER
I see prescription was sent over for 4mg and Patient is inquiring if they were able to get the 8mg of Zofran.

## 2025-04-16 ENCOUNTER — OFFICE VISIT (OUTPATIENT)
Dept: GASTROENTEROLOGY | Facility: CLINIC | Age: 70
End: 2025-04-16
Payer: COMMERCIAL

## 2025-04-16 VITALS
HEART RATE: 78 BPM | WEIGHT: 111 LBS | SYSTOLIC BLOOD PRESSURE: 148 MMHG | HEIGHT: 63 IN | BODY MASS INDEX: 19.67 KG/M2 | TEMPERATURE: 98.1 F | DIASTOLIC BLOOD PRESSURE: 83 MMHG

## 2025-04-16 DIAGNOSIS — K59.04 CHRONIC IDIOPATHIC CONSTIPATION: ICD-10-CM

## 2025-04-16 DIAGNOSIS — K21.9 GASTROESOPHAGEAL REFLUX DISEASE WITHOUT ESOPHAGITIS: ICD-10-CM

## 2025-04-16 DIAGNOSIS — R11.0 NAUSEA: Primary | ICD-10-CM

## 2025-04-16 PROCEDURE — 1159F MED LIST DOCD IN RCRD: CPT | Performed by: INTERNAL MEDICINE

## 2025-04-16 PROCEDURE — 3008F BODY MASS INDEX DOCD: CPT | Performed by: INTERNAL MEDICINE

## 2025-04-16 PROCEDURE — 99214 OFFICE O/P EST MOD 30 MIN: CPT | Performed by: INTERNAL MEDICINE

## 2025-04-16 PROCEDURE — 1036F TOBACCO NON-USER: CPT | Performed by: INTERNAL MEDICINE

## 2025-04-16 RX ORDER — PLECANATIDE 3 MG/1
3 TABLET ORAL DAILY
Qty: 90 TABLET | Refills: 3 | Status: SHIPPED | OUTPATIENT
Start: 2025-04-16 | End: 2026-04-16

## 2025-04-16 RX ORDER — ROSUVASTATIN CALCIUM 10 MG/1
10 TABLET, COATED ORAL NIGHTLY
COMMUNITY
Start: 2025-02-17

## 2025-04-16 RX ORDER — CIMETIDINE 800 MG/1
800 TABLET, FILM COATED ORAL 4 TIMES DAILY
Qty: 360 TABLET | Refills: 3 | Status: SHIPPED | OUTPATIENT
Start: 2025-04-16 | End: 2026-04-16

## 2025-04-16 RX ORDER — ONDANSETRON HYDROCHLORIDE 8 MG/1
8 TABLET, FILM COATED ORAL EVERY 8 HOURS PRN
Qty: 20 TABLET | Refills: 11 | Status: SHIPPED | OUTPATIENT
Start: 2025-04-16

## 2025-04-16 NOTE — PROGRESS NOTES
History Of Present Illness  Adelaide Cedillo is a 69 y.o. female presenting with bloating and nausea.     She has esophageal dysmotility presumably on the basis of her underlying connective tissue disease though does not have a diagnosis of chelsea scleroderma.  For the most part her symptoms are controlled with medications.  She did request a bigger quantity of Zofran as well as the 8 mg strength as it seems to provide better symptomatic relief.  She also complains of obstipation for which she is using MiraLAX as needed.  She had previously been given Linzess but did not tolerate it well.  More recently she has noted mucus in the stool.  Her last colonoscopy had 2 adenomas and a 5 year follow up in 2028 was recommended. EGD had a tortuous esophagus and fundic gland polyp removed. Weight stable.         Past Medical History  Medical History[1]    Surgical History  Surgical History[2]     Social History  She reports that she has never smoked. She has never used smokeless tobacco. She reports that she does not currently use alcohol. She reports that she does not use drugs.    Family History  Family History[3]     Allergies  Adhesive tape-silicones, Fentanyl, Iodinated contrast media, Latex, Prochlorperazine, Sulfa (sulfonamide antibiotics), and Iodine    Last Recorded Vitals  There were no vitals taken for this visit.       Physical Exam  Vitals reviewed.   Constitutional:       Appearance: Normal appearance.   Cardiovascular:      Rate and Rhythm: Normal rate and regular rhythm.   Pulmonary:      Effort: Pulmonary effort is normal.      Breath sounds: Normal breath sounds.   Abdominal:      General: Abdomen is flat. Bowel sounds are normal.      Palpations: Abdomen is soft.      Tenderness: There is no abdominal tenderness.   Neurological:      Mental Status: She is alert.           Labs  Lab Results   Component Value Date    GLUCOSE 83 02/08/2022    CALCIUM 9.7 02/08/2022     02/08/2022    K 4.1 02/08/2022     CO2 31 02/08/2022     (H) 02/08/2022    BUN 19 02/08/2022    CREATININE 0.97 02/08/2022     Lab Results   Component Value Date     02/08/2022    K 4.1 02/08/2022     (H) 02/08/2022    CO2 31 02/08/2022    BUN 19 02/08/2022    CREATININE 0.97 02/08/2022    CALCIUM 9.7 02/08/2022    PROT 6.4 02/08/2022    BILITOT 0.4 02/08/2022    ALKPHOS 45 02/08/2022    ALT 16 02/08/2022    AST 23 02/08/2022    GLUCOSE 83 02/08/2022           Imaging     Colonoscopy Screening  Result Date: 12/1/2023  Table formatting from the original result was not included. Impression 2 subcentimeter polyps were removed Normal. Small hemorrhoids Findings One 3 mm sessile polyp in the sigmoid colon; performed cold forceps biopsy with complete removal One 7 mm sessile polyp in the rectum; performed cold snare with complete removal and retrieved specimen The examination was otherwise normal; Internal small hemorrhoids observed during retroflexion Recommendation  Await pathology results  Repeat colonoscopy in 5 years  Personal history of colon polyps  Indication Personal history of colonic polyps Post Procedure Diagnosis Colon polyps Staff Staff Role No Staff Documented Medications See Anesthesia Record. Preprocedure A history and physical has been performed, and patient medication allergies have been reviewed. The patient's tolerance of previous anesthesia has been reviewed. The risks and benefits of the procedure and the sedation options and risks were discussed with the patient. All questions were answered and informed consent obtained. Details of the Procedure The patient underwent monitored anesthesia care, which was administered by an anesthesia professional. The patient's blood pressure, heart rate, level of consciousness, respirations, oxygen, ECG and ETCO2 were monitored throughout the procedure. A digital rectal exam was performed. The scope was introduced through the anus and advanced to the cecum. Retroflexion was  performed in the rectum. The quality of bowel preparation was evaluated using the [place] Bowel Preparation Scale with scores of: right colon = 2, transverse colon = 2, left colon = 3. The total BBPS score was 7. Bowel prep was adequate. The procedure was not difficult. The patient tolerated the procedure well. There were no apparent adverse events. Events Procedure Events Event Event Time ENDO SCOPE IN TIME 12/1/2023  9:44 AM ENDO CECUM REACHED 12/1/2023  9:54 AM ENDO SCOPE OUT TIME 12/1/2023 10:11 AM Specimens ID Type Source Tests Collected by Time 1 :  Tissue COLON - SIGMOID POLYP SURGICAL PATHOLOGY EXAM Lance Quintero MD 12/1/2023 1004 2 :  Tissue RECTUM POLYPECTOMY SURGICAL PATHOLOGY EXAM Lance Quintero MD 12/1/2023 1008 [facility] [address] [facility] [address] [place] [address] Department Of Medicine-gastroenterology [place] [id number] Procedure Provider Lance Quintero MD    Colonoscopy Screening  Table formatting from the original result was not included.  Impression  2 subcentimeter polyps were removed  Normal.  Small hemorrhoids    Findings  One 3 mm sessile polyp in the sigmoid colon; performed cold forceps biopsy   with complete removal  One 7 mm sessile polyp in the rectum; performed cold snare with complete   removal and retrieved specimen  The examination was otherwise normal;  Internal small hemorrhoids observed during retroflexion    Recommendation   Await pathology results    Repeat colonoscopy in 5 years    Personal history of colon polyps       Indication  Personal history of colonic polyps    Post Procedure Diagnosis  Colon polyps    Staff  Staff Role   No Staff Documented     Medications  See Anesthesia Record.     Preprocedure  A history and physical has been performed, and patient medication   allergies have been reviewed. The patient's tolerance of previous   anesthesia has been  reviewed. The risks and benefits of the procedure and   the sedation options and risks were discussed with the patient. All   questions were answered and informed consent obtained.    Details of the Procedure  The patient underwent monitored anesthesia care, which was administered by   an anesthesia professional. The patient's blood pressure, heart rate,   level of consciousness, respirations, oxygen, ECG and ETCO2 were monitored   throughout the procedure. A digital rectal exam was performed. The scope   was introduced through the anus and advanced to the cecum. Retroflexion   was performed in the rectum. The quality of bowel preparation was   evaluated using the [place] Bowel Preparation Scale with scores of: right   colon = 2, transverse colon = 2, left colon = 3. The total BBPS score was   7. Bowel prep was adequate. The procedure was not difficult. The patient   tolerated the procedure well. There were no apparent adverse events.     Events  Procedure Events   Event Event Time   ENDO SCOPE IN TIME 12/1/2023  9:44 AM   ENDO CECUM REACHED 12/1/2023  9:54 AM   ENDO SCOPE OUT TIME 12/1/2023 10:11 AM     Specimens  ID Type Source Tests Collected by Time   1 :  Tissue COLON - SIGMOID POLYP SURGICAL PATHOLOGY EXAM Lance Quintero MD 12/1/2023 1004   2 :  Tissue RECTUM POLYPECTOMY SURGICAL PATHOLOGY EXAM Lance Quintero MD 12/1/2023 1008     Procedure Location  [facility]  [address]  [facility] [id number]    Referring Provider  Lance Quintero Md  [address]  Department [place] [address]    Procedure Provider  Lance Quintero MD         ASSESSMENT & PLAN  Problem List Items Addressed This Visit           ICD-10-CM    Gastroesophageal reflux disease without esophagitis K21.9    Relevant Medications    cimetidine (Tagamet) 800 mg tablet    Other Relevant Orders    Follow Up In Gastroenterology     Other Visit Diagnoses          Codes      Nausea    -  Primary R11.0    Relevant Medications    ondansetron (Zofran) 8 mg tablet    Other Relevant Orders    Follow Up In Gastroenterology      Chronic idiopathic constipation     K59.04    Relevant Medications    plecanatide (Trulance) tablet tablet    Other Relevant Orders    Follow Up In Gastroenterology          At today's visit her cimetidine and Zofran 8 mg were renewed.  She will be started on Trulance 3 mg daily for constipation.  She should follow-up in 6 months time.    I spent 20 minutes in the professional and overall care of this patient.      Lance Quintero MD         [1]   Past Medical History:  Diagnosis Date    Encounter for therapeutic drug level monitoring 04/03/2017    Encounter for monitoring of methotrexate therapy   [2]   Past Surgical History:  Procedure Laterality Date    COLONOSCOPY     [3]   Family History  Problem Relation Name Age of Onset    Other (chronic kidney disease) Mother      Diabetes Mother      Hypertension Mother      Other (creutzfeldt ying disease) Father

## 2025-04-16 NOTE — PATIENT INSTRUCTIONS
I will continue you on high dose cimetidine and Zofran. For the bowels we will try Trulance daily instead of Linzess. Follow up in 6 months.

## 2025-04-17 ENCOUNTER — TELEPHONE (OUTPATIENT)
Dept: GASTROENTEROLOGY | Facility: HOSPITAL | Age: 70
End: 2025-04-17
Payer: COMMERCIAL

## 2025-04-17 NOTE — TELEPHONE ENCOUNTER
Pharmacy reached out regarding cimetidine (Tagamet) 800 mg tablet. There was a high dose alert stating patient should be taking 1 1/2 daily and asking to review dosage.

## 2025-04-18 DIAGNOSIS — R11.0 NAUSEA: ICD-10-CM

## 2025-04-21 RX ORDER — ONDANSETRON HYDROCHLORIDE 8 MG/1
8 TABLET, FILM COATED ORAL EVERY 8 HOURS PRN
Qty: 20 TABLET | Refills: 11 | Status: SHIPPED | OUTPATIENT
Start: 2025-04-21

## 2025-05-02 ENCOUNTER — TELEPHONE (OUTPATIENT)
Dept: GASTROENTEROLOGY | Facility: HOSPITAL | Age: 70
End: 2025-05-02
Payer: COMMERCIAL

## 2025-05-02 DIAGNOSIS — R11.0 NAUSEA: ICD-10-CM

## 2025-05-02 RX ORDER — ONDANSETRON 4 MG/1
4 TABLET, FILM COATED ORAL EVERY 8 HOURS PRN
Qty: 90 TABLET | Refills: 0 | Status: SHIPPED | OUTPATIENT
Start: 2025-05-02 | End: 2026-05-02

## 2025-05-02 NOTE — TELEPHONE ENCOUNTER
Assisting patient with PA for Zofran, it has been denied twice. Patient stated last time they had this issue it was due to mail order pharmacy not wanting to cover, so they are requesting to have prescription sent to [facility], [address] to see if this will help.

## 2025-05-06 NOTE — TELEPHONE ENCOUNTER
Patient has had diarrhea for the last few days. Has been taking pepto bismol for 2 days with no relief. Started trulance 2 days ago as well. Patient requesting feedback or if something can be sent to pharmacy.   ________________________________________    Isaías wrote prescription for 4 mg, can patient double up on 4mg to be able to use until next prescription?

## 2025-05-21 ENCOUNTER — APPOINTMENT (OUTPATIENT)
Dept: GASTROENTEROLOGY | Facility: CLINIC | Age: 70
End: 2025-05-21
Payer: COMMERCIAL